# Patient Record
Sex: FEMALE | Race: WHITE | Employment: FULL TIME | URBAN - METROPOLITAN AREA
[De-identification: names, ages, dates, MRNs, and addresses within clinical notes are randomized per-mention and may not be internally consistent; named-entity substitution may affect disease eponyms.]

---

## 2019-06-22 ENCOUNTER — OFFICE VISIT (OUTPATIENT)
Dept: FAMILY MEDICINE CLINIC | Age: 34
End: 2019-06-22
Payer: COMMERCIAL

## 2019-06-22 VITALS
DIASTOLIC BLOOD PRESSURE: 70 MMHG | BODY MASS INDEX: 29.84 KG/M2 | OXYGEN SATURATION: 98 % | SYSTOLIC BLOOD PRESSURE: 122 MMHG | TEMPERATURE: 96.9 F | HEIGHT: 64 IN | HEART RATE: 74 BPM | WEIGHT: 174.8 LBS

## 2019-06-22 DIAGNOSIS — J02.9 ACUTE PHARYNGITIS, UNSPECIFIED ETIOLOGY: Primary | ICD-10-CM

## 2019-06-22 DIAGNOSIS — Z20.828 MONO EXPOSURE: ICD-10-CM

## 2019-06-22 DIAGNOSIS — J02.9 SORE THROAT: ICD-10-CM

## 2019-06-22 LAB
HETEROPHILE ANTIBODIES: NEGATIVE
S PYO AG THROAT QL: NORMAL

## 2019-06-22 PROCEDURE — 86308 HETEROPHILE ANTIBODY SCREEN: CPT | Performed by: NURSE PRACTITIONER

## 2019-06-22 PROCEDURE — 99213 OFFICE O/P EST LOW 20 MIN: CPT | Performed by: NURSE PRACTITIONER

## 2019-06-22 PROCEDURE — 1036F TOBACCO NON-USER: CPT | Performed by: NURSE PRACTITIONER

## 2019-06-22 PROCEDURE — G8427 DOCREV CUR MEDS BY ELIG CLIN: HCPCS | Performed by: NURSE PRACTITIONER

## 2019-06-22 PROCEDURE — 87880 STREP A ASSAY W/OPTIC: CPT | Performed by: NURSE PRACTITIONER

## 2019-06-22 PROCEDURE — G8417 CALC BMI ABV UP PARAM F/U: HCPCS | Performed by: NURSE PRACTITIONER

## 2019-06-22 RX ORDER — SPIRONOLACTONE 50 MG/1
TABLET, FILM COATED ORAL
Refills: 1 | COMMUNITY
Start: 2019-06-13 | End: 2022-07-22

## 2019-06-22 ASSESSMENT — ENCOUNTER SYMPTOMS
SORE THROAT: 1
RHINORRHEA: 1
SHORTNESS OF BREATH: 0
SINUS PAIN: 0
SINUS PRESSURE: 0
COUGH: 1
WHEEZING: 0

## 2019-06-22 ASSESSMENT — PATIENT HEALTH QUESTIONNAIRE - PHQ9: DEPRESSION UNABLE TO ASSESS: PT REFUSES

## 2019-06-22 NOTE — PROGRESS NOTES
Subjective  Azeem Wayne, 30 y.o. female presents today with:  Chief Complaint   Patient presents with    Other     pt. boyfriend has mono tested positive on friday, would like to be tested for it as well. Pharyngitis   This is a new problem. The current episode started in the past 7 days. The problem has been unchanged. Associated symptoms include congestion (has allergies), coughing (mild) and a sore throat (scratchy). Pertinent negatives include no chest pain, chills or fever. 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 Review of Systems   Constitutional: Negative for chills and fever. HENT: Positive for congestion (has allergies), postnasal drip (allergies), rhinorrhea (allergies) and sore throat (scratchy). Negative for ear pain, sinus pressure and sinus pain. Respiratory: Positive for cough (mild). Negative for shortness of breath and wheezing. Cardiovascular: Negative for chest pain. Objective    Vitals:    06/22/19 1139   BP: 122/70   Site: Left Upper Arm   Position: Sitting   Cuff Size: Large Adult   Pulse: 74   Temp: 96.9 °F (36.1 °C)   TempSrc: Tympanic   SpO2: 98%   Weight: 174 lb 12.8 oz (79.3 kg)   Height: 5' 4\" (1.626 m)       Physical Exam   Constitutional: She appears well-developed and well-nourished. No distress. HENT:   Head: Normocephalic and atraumatic. Right Ear: Tympanic membrane is not erythematous and not bulging. No middle ear effusion. Left Ear: Tympanic membrane is not erythematous and not bulging. No middle ear effusion.    Nose: Right sinus exhibits no maxillary sinus tenderness and no frontal sinus tenderness. Left sinus exhibits no maxillary sinus tenderness and no frontal sinus tenderness. Mouth/Throat: No oropharyngeal exudate, posterior oropharyngeal edema or posterior oropharyngeal erythema. Eyes: Conjunctivae are normal. Right eye exhibits no discharge. Left eye exhibits no discharge. Cardiovascular: Normal rate, regular rhythm and normal heart sounds. Pulmonary/Chest: Effort normal and breath sounds normal. No stridor. No respiratory distress. She has no decreased breath sounds. She has no wheezes. She has no rhonchi. Lymphadenopathy:     She has no cervical adenopathy. Skin: Skin is warm and dry. Vitals reviewed. POC Testing Today:   Results for POC orders placed in visit on 06/22/19   POCT Infectious mononucleosis Abs (mono)   Result Value Ref Range    Monospot negative    POCT rapid strep A   Result Value Ref Range    Strep A Ag None Detected None Detected       Assessment & Plan     Mihai Delgado was seen today for other. Diagnoses and all orders for this visit:    Acute pharyngitis, unspecified etiology    Sore throat  -     Natan Barr Virus (Ebv) Antibody Panel I; Future  -     POCT rapid strep A  -     Throat Culture; Future  -     POCT Infectious mononucleosis Abs (mono)    Mono exposure  -     Antan Barr Virus (Ebv) Antibody Panel I; Future  -     POCT Infectious mononucleosis Abs (mono)      Return if symptoms worsen or fail to improve, for follow up with PCP. Educated patient regarding mono precautions, advised to refrain from softball and dance at this time. POC mono today was negative, will obtain antibody panel. Side effects and adverse effects of any medication prescribed today, as well as treatment plan/rationale, follow-up care, and result expectations have been discussed with the patient. Expresses understanding and desires to proceed with treatment plan.       Discussed signs and symptoms which require immediate follow-up in ED/call to 911. Understanding verbalized. I have reviewed and updated the electronic medical record.     Adryan Armendariz, ARTEM - CNP

## 2019-06-22 NOTE — PATIENT INSTRUCTIONS
Patient Education        Sore Throat: Care Instructions  Your Care Instructions    Infection by bacteria or a virus causes most sore throats. Cigarette smoke, dry air, air pollution, allergies, and yelling can also cause a sore throat. Sore throats can be painful and annoying. Fortunately, most sore throats go away on their own. If you have a bacterial infection, your doctor may prescribe antibiotics. Follow-up care is a key part of your treatment and safety. Be sure to make and go to all appointments, and call your doctor if you are having problems. It's also a good idea to know your test results and keep a list of the medicines you take. How can you care for yourself at home? · If your doctor prescribed antibiotics, take them as directed. Do not stop taking them just because you feel better. You need to take the full course of antibiotics. · Gargle with warm salt water once an hour to help reduce swelling and relieve discomfort. Use 1 teaspoon of salt mixed in 1 cup of warm water. · Take an over-the-counter pain medicine, such as acetaminophen (Tylenol), ibuprofen (Advil, Motrin), or naproxen (Aleve). Read and follow all instructions on the label. · Be careful when taking over-the-counter cold or flu medicines and Tylenol at the same time. Many of these medicines have acetaminophen, which is Tylenol. Read the labels to make sure that you are not taking more than the recommended dose. Too much acetaminophen (Tylenol) can be harmful. · Drink plenty of fluids. Fluids may help soothe an irritated throat. Hot fluids, such as tea or soup, may help decrease throat pain. · Use over-the-counter throat lozenges to soothe pain. Regular cough drops or hard candy may also help. These should not be given to young children because of the risk of choking. · Do not smoke or allow others to smoke around you. If you need help quitting, talk to your doctor about stop-smoking programs and medicines.  These can increase your chances of quitting for good. · Use a vaporizer or humidifier to add moisture to your bedroom. Follow the directions for cleaning the machine. When should you call for help? Call your doctor now or seek immediate medical care if:    · You have new or worse trouble swallowing.     · Your sore throat gets much worse on one side.    Watch closely for changes in your health, and be sure to contact your doctor if you do not get better as expected. Where can you learn more? Go to https://zhouwu.SNAPP'. org and sign in to your Lee[employer] account. Enter G503 in the Biomoti box to learn more about \"Sore Throat: Care Instructions. \"     If you do not have an account, please click on the \"Sign Up Now\" link. Current as of: October 21, 2018  Content Version: 12.0  © 1622-1960 Healthwise, Incorporated. Care instructions adapted under license by [facility] ([facility]). If you have questions about a medical condition or this instruction, always ask your healthcare professional. [place] [address] any warranty or liability for your use of this information.

## 2021-10-29 ENCOUNTER — OFFICE VISIT (OUTPATIENT)
Dept: PRIMARY CARE CLINIC | Age: 36
End: 2021-10-29
Payer: COMMERCIAL

## 2021-10-29 VITALS
OXYGEN SATURATION: 99 % | SYSTOLIC BLOOD PRESSURE: 126 MMHG | HEIGHT: 64 IN | WEIGHT: 181 LBS | BODY MASS INDEX: 30.9 KG/M2 | DIASTOLIC BLOOD PRESSURE: 64 MMHG | TEMPERATURE: 98.5 F | HEART RATE: 90 BPM

## 2021-10-29 DIAGNOSIS — Z00.00 PREVENTATIVE HEALTH CARE: Primary | ICD-10-CM

## 2021-10-29 DIAGNOSIS — Z23 NEED FOR INFLUENZA VACCINATION: ICD-10-CM

## 2021-10-29 DIAGNOSIS — R73.9 HYPERGLYCEMIA: ICD-10-CM

## 2021-10-29 DIAGNOSIS — D64.9 ANEMIA, UNSPECIFIED TYPE: ICD-10-CM

## 2021-10-29 DIAGNOSIS — E78.5 HYPERLIPIDEMIA, UNSPECIFIED HYPERLIPIDEMIA TYPE: ICD-10-CM

## 2021-10-29 PROCEDURE — 99395 PREV VISIT EST AGE 18-39: CPT | Performed by: INTERNAL MEDICINE

## 2021-10-29 PROCEDURE — G8482 FLU IMMUNIZE ORDER/ADMIN: HCPCS | Performed by: INTERNAL MEDICINE

## 2021-10-29 PROCEDURE — 90471 IMMUNIZATION ADMIN: CPT | Performed by: INTERNAL MEDICINE

## 2021-10-29 PROCEDURE — 90674 CCIIV4 VAC NO PRSV 0.5 ML IM: CPT | Performed by: INTERNAL MEDICINE

## 2021-10-29 RX ORDER — DESOGESTREL AND ETHINYL ESTRADIOL 0.15-0.03
KIT ORAL
COMMUNITY
Start: 2020-02-04 | End: 2022-07-22

## 2021-10-29 SDOH — ECONOMIC STABILITY: FOOD INSECURITY: WITHIN THE PAST 12 MONTHS, YOU WORRIED THAT YOUR FOOD WOULD RUN OUT BEFORE YOU GOT MONEY TO BUY MORE.: NEVER TRUE

## 2021-10-29 SDOH — ECONOMIC STABILITY: TRANSPORTATION INSECURITY
IN THE PAST 12 MONTHS, HAS THE LACK OF TRANSPORTATION KEPT YOU FROM MEDICAL APPOINTMENTS OR FROM GETTING MEDICATIONS?: NO

## 2021-10-29 SDOH — ECONOMIC STABILITY: TRANSPORTATION INSECURITY
IN THE PAST 12 MONTHS, HAS LACK OF TRANSPORTATION KEPT YOU FROM MEETINGS, WORK, OR FROM GETTING THINGS NEEDED FOR DAILY LIVING?: NO

## 2021-10-29 SDOH — ECONOMIC STABILITY: FOOD INSECURITY: WITHIN THE PAST 12 MONTHS, THE FOOD YOU BOUGHT JUST DIDN'T LAST AND YOU DIDN'T HAVE MONEY TO GET MORE.: NEVER TRUE

## 2021-10-29 ASSESSMENT — ENCOUNTER SYMPTOMS
ABDOMINAL DISTENTION: 0
APNEA: 0
FACIAL SWELLING: 0
PHOTOPHOBIA: 0
BLOOD IN STOOL: 0
CHOKING: 0

## 2021-10-29 ASSESSMENT — PATIENT HEALTH QUESTIONNAIRE - PHQ9
2. FEELING DOWN, DEPRESSED OR HOPELESS: 0
SUM OF ALL RESPONSES TO PHQ QUESTIONS 1-9: 0
1. LITTLE INTEREST OR PLEASURE IN DOING THINGS: 0
SUM OF ALL RESPONSES TO PHQ QUESTIONS 1-9: 0
SUM OF ALL RESPONSES TO PHQ9 QUESTIONS 1 & 2: 0
SUM OF ALL RESPONSES TO PHQ QUESTIONS 1-9: 0

## 2021-10-29 ASSESSMENT — SOCIAL DETERMINANTS OF HEALTH (SDOH): HOW HARD IS IT FOR YOU TO PAY FOR THE VERY BASICS LIKE FOOD, HOUSING, MEDICAL CARE, AND HEATING?: NOT HARD AT ALL

## 2021-10-29 NOTE — PROGRESS NOTES
Lucia Hale 31 y.o. female presents today with   Chief Complaint   Patient presents with   [facility] Doctor    Annual Exam       HPI annual exam  Concern of anemia    Past Medical History:   Diagnosis Date    Acne      Patient Active Problem List    Diagnosis Date Noted    Other acne 07/05/2012     No past surgical history on file. No family history on file. Social History     Socioeconomic History    Marital status:      Spouse name: None    Number of children: None    Years of education: None    Highest education level: None   Occupational History    None   Tobacco Use    Smoking status: Never Smoker    Smokeless tobacco: Never Used   Substance and Sexual Activity    Alcohol use: None    Drug use: None    Sexual activity: None   Other Topics Concern    None   Social History Narrative    None     Social Determinants of Health     Financial Resource Strain: Low Risk     Difficulty of Paying Living Expenses: Not hard at all   Food Insecurity: No Food Insecurity    Worried About Running Out of Food in the Last Year: Never true    Marilee of Food in the Last Year: Never true   Transportation Needs: No Transportation Needs    Lack of Transportation (Medical): No    Lack of Transportation (Non-Medical): No   Physical Activity:     Days of Exercise per Week:     Minutes of Exercise per Session:    Stress:     Feeling of Stress :    Social Connections:     Frequency of Communication with Friends and Family:     Frequency of Social Gatherings with Friends and Family:     Attends Mandaen Services:     Active Member of Clubs or Organizations:     Attends Club or Organization Meetings:     Marital Status:    Intimate Partner Violence:     Fear of Current or Ex-Partner:     Emotionally Abused:     Physically Abused:     Sexually Abused:      No Known Allergies    Review of Systems   Constitutional: Negative for chills and fever.    HENT: Negative for facial swelling and nosebleeds. Eyes: Negative for photophobia and visual disturbance. Respiratory: Negative for apnea and choking. Cardiovascular: Negative for chest pain and palpitations. Gastrointestinal: Negative for abdominal distention and blood in stool. Genitourinary: Negative for enuresis and hematuria. Musculoskeletal: Negative for gait problem and joint swelling. Skin: Negative for rash. Neurological: Negative for syncope and speech difficulty. Hematological: Does not bruise/bleed easily. Psychiatric/Behavioral: Negative for hallucinations and suicidal ideas. Vitals:    10/29/21 0827   BP: 126/64   Site: Left Upper Arm   Cuff Size: Large Adult   Pulse: 90   Temp: 98.5 °F (36.9 °C)   SpO2: 99%   Weight: 181 lb (82.1 kg)   Height: 5' 4\" (1.626 m)       Physical Exam  Constitutional:       Appearance: She is well-developed. HENT:      Head: Normocephalic and atraumatic. Eyes:      Pupils: Pupils are equal, round, and reactive to light. Cardiovascular:      Rate and Rhythm: Normal rate and regular rhythm. Heart sounds: Normal heart sounds. Pulmonary:      Effort: No respiratory distress. Breath sounds: Normal breath sounds. No wheezing. Abdominal:      General: There is no distension. Musculoskeletal:         General: Normal range of motion. Cervical back: Normal range of motion. Neurological:      Mental Status: She is alert and oriented to person, place, and time. Psychiatric:         Mood and Affect: Mood normal.          Assessment/Plan  Aden Frias was seen today for established new doctor and annual exam.    Diagnoses and all orders for this visit:    Preventative health care  -     Lipid Panel; Future  -     Hemoglobin A1C; Future  -     Comprehensive Metabolic Panel; Future  -     CBC With Auto Differential; Future    Hyperlipidemia, unspecified hyperlipidemia type  -     Lipid Panel;  Future    Hyperglycemia  -     Hemoglobin A1C; Future  -     Comprehensive Metabolic Panel; Future    Anemia, unspecified type  -     CBC With Auto Differential; Future    Need for influenza vaccination  -     INFLUENZA, MDCK QUADV, 2 YRS AND OLDER, IM, PF, PREFILL SYR OR SDV, 0.5ML (FLUCELVAX QUADV, PF)        No follow-ups on file.     Taylor Bonilla MD

## 2021-10-30 LAB
ALBUMIN: 4.1 G/DL (ref 3.4–5)
ALP BLD-CCNC: 53 U/L (ref 33–110)
ALT SERPL-CCNC: 17 U/L (ref 7–45)
ANION GAP SERPL CALCULATED.3IONS-SCNC: 13 MMOL/L (ref 10–20)
AST SERPL-CCNC: 19 U/L (ref 9–39)
BASOPHILS # BLD: 0.06 X10E9/L (ref 0–0.1)
BASOPHILS RELATIVE PERCENT: 0.9 % (ref 0–2)
BICARBONATE: 24 MMOL/L (ref 21–32)
BILIRUB SERPL-MCNC: 0.4 MG/DL (ref 0–1.2)
CALCIUM SERPL-MCNC: 9.8 MG/DL (ref 8.6–10.3)
CHLORIDE BLD-SCNC: 104 MMOL/L (ref 98–107)
CHOLESTEROL/HDL RATIO: 2.8
CHOLESTEROL: 188 MG/DL (ref 0–199)
CREAT SERPL-MCNC: 0.86 MG/DL (ref 0.5–1)
EOSINOPHIL # BLD: 0.23 X10E9/L (ref 0–0.7)
EOSINOPHILS RELATIVE PERCENT: 3.4 % (ref 0–6)
ERYTHROCYTE [DISTWIDTH] IN BLOOD BY AUTOMATED COUNT: 12.3 % (ref 11.5–14)
ESTIMATED AVERAGE GLUCOSE: 94 MG/DL
GFR AFRICAN AMERICAN: >60 ML/MIN/1.73M2
GFR NON-AFRICAN AMERICAN: >60 ML/MIN/1.73M2
GLUCOSE: 82 MG/DL (ref 74–99)
HBA1C MFR BLD: 4.9 %
HCT VFR BLD CALC: 39.5 % (ref 36–46)
HDLC SERPL-MCNC: 68 MG/DL
HEMOGLOBIN: 12.9 G/DL (ref 12–16)
IMMATURE GRANULOCYTES %: 0.3 % (ref 0–0.9)
LDL CHOLESTEROL: 98 MG/DL (ref 0–99)
LYMPHOCYTES # BLD: 25.5 % (ref 13–44)
LYMPHOCYTES RELATIVE PERCENT: 1.71 X10E9/L (ref 1.2–4.8)
MCHC RBC AUTO-ENTMCNC: 32.7 G/DL (ref 32–36)
MCV RBC AUTO: 92 FL (ref 80–100)
MONOCYTES # BLD: 0.67 X10E9/L (ref 0.1–1)
MONOCYTES RELATIVE PERCENT: 10 % (ref 2–10)
NEUTROPHILS RELATIVE PERCENT: 59.9 % (ref 40–80)
NEUTROPHILS: 4.01 X10E9/L (ref 1.2–7.7)
PLATELET # BLD: 301 X10E9/L (ref 150–450)
POTASSIUM SERPL-SCNC: 4.1 MMOL/L (ref 3.5–5.3)
RBC # BLD: 4.31 X10E12/L (ref 4–5.2)
SODIUM BLD-SCNC: 137 MMOL/L (ref 136–145)
TOTAL PROTEIN: 7.3 G/DL (ref 6.4–8.2)
TRIGL SERPL-MCNC: 110 MG/DL (ref 0–149)
UREA NITROGEN: 8 MG/DL (ref 6–23)
VLDLC SERPL CALC-MCNC: 22 MG/DL (ref 0–40)
WBC: 6.7 X10E9/L (ref 4.4–11.3)

## 2022-01-07 DIAGNOSIS — U07.1 COVID-19: Primary | ICD-10-CM

## 2022-07-22 ENCOUNTER — OFFICE VISIT (OUTPATIENT)
Dept: PRIMARY CARE CLINIC | Age: 37
End: 2022-07-22
Payer: COMMERCIAL

## 2022-07-22 ENCOUNTER — HOSPITAL ENCOUNTER (OUTPATIENT)
Dept: GENERAL RADIOLOGY | Age: 37
Discharge: HOME | End: 2022-07-24
Payer: COMMERCIAL

## 2022-07-22 VITALS
RESPIRATION RATE: 18 BRPM | OXYGEN SATURATION: 99 % | WEIGHT: 197.8 LBS | TEMPERATURE: 97.9 F | HEIGHT: 64 IN | SYSTOLIC BLOOD PRESSURE: 120 MMHG | DIASTOLIC BLOOD PRESSURE: 78 MMHG | BODY MASS INDEX: 33.77 KG/M2 | HEART RATE: 69 BPM

## 2022-07-22 DIAGNOSIS — S99.912D LEFT ANKLE INJURY, SUBSEQUENT ENCOUNTER: ICD-10-CM

## 2022-07-22 DIAGNOSIS — Z00.00 HEALTH CARE MAINTENANCE: ICD-10-CM

## 2022-07-22 DIAGNOSIS — S99.912D LEFT ANKLE INJURY, SUBSEQUENT ENCOUNTER: Primary | ICD-10-CM

## 2022-07-22 PROCEDURE — 73630 X-RAY EXAM OF FOOT: CPT

## 2022-07-22 PROCEDURE — 73610 X-RAY EXAM OF ANKLE: CPT

## 2022-07-22 PROCEDURE — 99213 OFFICE O/P EST LOW 20 MIN: CPT | Performed by: INTERNAL MEDICINE

## 2022-07-22 RX ORDER — PHENAZOPYRIDINE HYDROCHLORIDE 100 MG/1
TABLET, FILM COATED ORAL
COMMUNITY
End: 2022-07-27

## 2022-07-22 ASSESSMENT — PATIENT HEALTH QUESTIONNAIRE - PHQ9
SUM OF ALL RESPONSES TO PHQ QUESTIONS 1-9: 0
8. MOVING OR SPEAKING SO SLOWLY THAT OTHER PEOPLE COULD HAVE NOTICED. OR THE OPPOSITE, BEING SO FIGETY OR RESTLESS THAT YOU HAVE BEEN MOVING AROUND A LOT MORE THAN USUAL: 0
SUM OF ALL RESPONSES TO PHQ QUESTIONS 1-9: 0
SUM OF ALL RESPONSES TO PHQ9 QUESTIONS 1 & 2: 0
10. IF YOU CHECKED OFF ANY PROBLEMS, HOW DIFFICULT HAVE THESE PROBLEMS MADE IT FOR YOU TO DO YOUR WORK, TAKE CARE OF THINGS AT HOME, OR GET ALONG WITH OTHER PEOPLE: 0
5. POOR APPETITE OR OVEREATING: 0
3. TROUBLE FALLING OR STAYING ASLEEP: 0
2. FEELING DOWN, DEPRESSED OR HOPELESS: 0
SUM OF ALL RESPONSES TO PHQ QUESTIONS 1-9: 0
1. LITTLE INTEREST OR PLEASURE IN DOING THINGS: 0
SUM OF ALL RESPONSES TO PHQ QUESTIONS 1-9: 0
9. THOUGHTS THAT YOU WOULD BE BETTER OFF DEAD, OR OF HURTING YOURSELF: 0
7. TROUBLE CONCENTRATING ON THINGS, SUCH AS READING THE NEWSPAPER OR WATCHING TELEVISION: 0
4. FEELING TIRED OR HAVING LITTLE ENERGY: 0
6. FEELING BAD ABOUT YOURSELF - OR THAT YOU ARE A FAILURE OR HAVE LET YOURSELF OR YOUR FAMILY DOWN: 0

## 2022-07-22 ASSESSMENT — COLUMBIA-SUICIDE SEVERITY RATING SCALE - C-SSRS
6. HAVE YOU EVER DONE ANYTHING, STARTED TO DO ANYTHING, OR PREPARED TO DO ANYTHING TO END YOUR LIFE?: NO
1. WITHIN THE PAST MONTH, HAVE YOU WISHED YOU WERE DEAD OR WISHED YOU COULD GO TO SLEEP AND NOT WAKE UP?: NO
2. HAVE YOU ACTUALLY HAD ANY THOUGHTS OF KILLING YOURSELF?: NO

## 2022-07-22 NOTE — PROGRESS NOTES
Subjective:      Patient ID: Bettye Mcgrath is a 30 y.o. female    Left ankle pain x 1 week   HPI  Pt presents with sudden onset of dull left ankle pain  after twisting the foot when getting off her bed. Pain is rated 5/10, nonradiating. Assoc foot swelling, no tingling or numbness, no weakness. Claims XR at an urgent care showed possible bone fragment. Past Medical History:   Diagnosis Date    Acne      History reviewed. No pertinent surgical history. Social History     Socioeconomic History    Marital status:      Spouse name: Not on file    Number of children: Not on file    Years of education: Not on file    Highest education level: Not on file   Occupational History    Not on file   Tobacco Use    Smoking status: Never    Smokeless tobacco: Never   Substance and Sexual Activity    Alcohol use: Not on file    Drug use: Not on file    Sexual activity: Not on file   Other Topics Concern    Not on file   Social History Narrative    Not on file     Social Determinants of Health     Financial Resource Strain: Low Risk     Difficulty of Paying Living Expenses: Not hard at all   Food Insecurity: No Food Insecurity    Worried About Running Out of Food in the Last Year: Never true    [address] in the Last Year: Never true   Transportation Needs: No Transportation Needs    Lack of Transportation (Medical): No    Lack of Transportation (Non-Medical): No   Physical Activity: Not on file   Stress: Not on file   Social Connections: Not on file   Intimate Partner Violence: Not on file   Housing Stability: Not on file     History reviewed. No pertinent family history. Allergies:  Patient has no known allergies. Patient Active Problem List   Diagnosis    Other acne     Current Outpatient Medications on File Prior to Visit   Medication Sig Dispense Refill    phenazopyridine (PYRIDIUM) 100 MG tablet Pyridium 100 mg tablet   Take 1 tablet 3 times a day by oral route.        No current facility-administered medications on file prior to visit. Review of Systems   HENT:  Negative for rhinorrhea, sinus pressure, sinus pain, sneezing and sore throat. Respiratory:  Negative for cough, shortness of breath and wheezing. Cardiovascular:  Negative for chest pain. Gastrointestinal:  Negative for abdominal pain, diarrhea, nausea and vomiting. Endocrine: Negative for cold intolerance and heat intolerance. Genitourinary:  Negative for dysuria and frequency. Musculoskeletal:  Positive for arthralgias and joint swelling. Neurological:  Negative for dizziness and light-headedness. Psychiatric/Behavioral:  Negative for dysphoric mood. The patient is not nervous/anxious. Objective:   /78   Pulse 69   Temp 97.9 °F (36.6 °C)   Resp 18   Ht 5' 4\" (1.626 m)   Wt 197 lb 12.8 oz (89.7 kg)   SpO2 99%   BMI 33.95 kg/m²     Physical Exam  Constitutional:       General: She is not in acute distress. Appearance: She is not diaphoretic. Cardiovascular:      Rate and Rhythm: Normal rate and regular rhythm. Heart sounds: Normal heart sounds, S1 normal and S2 normal.   Pulmonary:      Effort: Pulmonary effort is normal. No respiratory distress. Breath sounds: Normal breath sounds. No wheezing or rales. Chest:      Chest wall: No tenderness. Abdominal:      General: Bowel sounds are normal.      Tenderness: There is no abdominal tenderness. Musculoskeletal:      Comments: Left foot and ankle swelling and ecchymosis with marked lateral malleolar and lateral metatarsal TTP   Left ankle ROM limited in flexion, extension, inversion and eversion   Gait is antalgic     Assessment:       Diagnosis Orders   1.  Left ankle injury, subsequent encounter  XR ANKLE LEFT (MIN 3 VIEWS)    XR FOOT LEFT (MIN 3 VIEWS)      2. Health care maintenance  HIV Screen    Hepatitis C Antibody        Plan:      Orders Placed This Encounter   Procedures    XR ANKLE LEFT (MIN 3 VIEWS)     Standing Status:   Future Number of Occurrences:   1     Standing Expiration Date:   7/22/2023     Order Specific Question:   Reason for exam:     Answer:   ?? fracture    XR FOOT LEFT (MIN 3 VIEWS)     Standing Status:   Future     Number of Occurrences:   1     Standing Expiration Date:   7/22/2023     Order Specific Question:   Reason for exam:     Answer:   ?? fracture    HIV Screen     Standing Status:   Future     Standing Expiration Date:   7/22/2023    Hepatitis C Antibody     Standing Status:   Future     Standing Expiration Date:   7/22/2023     No orders of the defined types were placed in this encounter. OTC ibuprofen as needed. Return if symptoms worsen or fail to improve.

## 2022-07-23 ASSESSMENT — ENCOUNTER SYMPTOMS
SORE THROAT: 0
VOMITING: 0
SINUS PAIN: 0
DIARRHEA: 0
SHORTNESS OF BREATH: 0
COUGH: 0
NAUSEA: 0
WHEEZING: 0
SINUS PRESSURE: 0
RHINORRHEA: 0
ABDOMINAL PAIN: 0

## 2022-07-24 DIAGNOSIS — S92.023P: Primary | ICD-10-CM

## 2022-07-25 ENCOUNTER — TELEPHONE (OUTPATIENT)
Dept: PRIMARY CARE CLINIC | Age: 37
End: 2022-07-25

## 2022-07-25 DIAGNOSIS — S92.023P: ICD-10-CM

## 2022-07-25 DIAGNOSIS — S99.912D LEFT ANKLE INJURY, SUBSEQUENT ENCOUNTER: Primary | ICD-10-CM

## 2022-07-27 ENCOUNTER — INITIAL CONSULT (OUTPATIENT)
Dept: PODIATRY | Age: 37
End: 2022-07-27
Payer: COMMERCIAL

## 2022-07-27 VITALS — BODY MASS INDEX: 32.44 KG/M2 | HEIGHT: 64 IN | WEIGHT: 190 LBS | TEMPERATURE: 96.9 F

## 2022-07-27 DIAGNOSIS — S93.492A SPRAIN OF ANTERIOR TALOFIBULAR LIGAMENT OF LEFT ANKLE, INITIAL ENCOUNTER: ICD-10-CM

## 2022-07-27 DIAGNOSIS — M25.572 ACUTE LEFT ANKLE PAIN: Primary | ICD-10-CM

## 2022-07-27 PROCEDURE — 99203 OFFICE O/P NEW LOW 30 MIN: CPT | Performed by: PODIATRIST

## 2022-07-27 ASSESSMENT — ENCOUNTER SYMPTOMS
BACK PAIN: 0
SHORTNESS OF BREATH: 0
NAUSEA: 0
VOMITING: 0

## 2022-07-27 NOTE — PROGRESS NOTES
[address] [facility]  [address] [place] [address]  Dept: [phone]  Loc: [phone]       Buddy Dose  (1985)    7/27/22    Subjective     Buddy Dose is 30 y.o. female who complains today of:    Chief Complaint   Patient presents with    Foot Pain     Left     Ankle Pain     Left       Buddy Dose is seen in consultation at the request of Yara Willson MD for evaluation of left foot and ankle pain. HPI: Patient presents with a complaint of left foot and ankle pain. Patient states that she turned her ankle when getting out of bed about 2 weeks ago. Patient describes having an injury consistent with an ankle inversion. Patient describes having achy and tingling sensations to the left foot and ankle. Patient rates her pain level at 4/10. Patient states that her pain level has improved since the initial injury. Radiographs were performed at [facility] primary care which were positive for a distant avulsion fracture to the anterior process of the calcaneus, no tib-fib or talar fracture noted. Patient was dispensed a walking boot. Patient states that she has been taking over-the-counter ibuprofen on a sporadic basis. Patient states that his decrease in her pain when wearing the boot. Patient states that walking or standing without the boot in place increases her pain, rest decreases her pain. Patient does report a history of bilateral ankle sprains in the past.    Review of Systems   Constitutional:  Negative for chills and fever. HENT:  Negative for hearing loss. Respiratory:  Negative for shortness of breath. Cardiovascular:  Negative for chest pain. Gastrointestinal:  Negative for nausea and vomiting. Genitourinary:  Negative for difficulty urinating. Musculoskeletal:  Positive for gait problem. Negative for back pain. Skin:  Negative for wound.    Neurological: Negative for numbness. Hematological:  Bruises/bleeds easily. Psychiatric/Behavioral:  Negative for sleep disturbance. Allergies:  Patient has no known allergies. No current outpatient medications on file prior to visit. No current facility-administered medications on file prior to visit. Past Medical History:   Diagnosis Date    Acne      No past surgical history on file. Social History     Socioeconomic History    Marital status:      Spouse name: Not on file    Number of children: Not on file    Years of education: Not on file    Highest education level: Not on file   Occupational History    Not on file   Tobacco Use    Smoking status: Never    Smokeless tobacco: Never   Substance and Sexual Activity    Alcohol use: Not on file    Drug use: Not on file    Sexual activity: Not on file   Other Topics Concern    Not on file   Social History Narrative    Not on file     Social Determinants of Health     Financial Resource Strain: Low Risk     Difficulty of Paying Living Expenses: Not hard at all   Food Insecurity: No Food Insecurity    Worried About Running Out of Food in the Last Year: Never true    [address] in the Last Year: Never true   Transportation Needs: No Transportation Needs    Lack of Transportation (Medical): No    Lack of Transportation (Non-Medical): No   Physical Activity: Not on file   Stress: Not on file   Social Connections: Not on file   Intimate Partner Violence: Not on file   Housing Stability: Not on file     No family history on file. Objective:   Vitals:  Temp 96.9 °F (36.1 °C)   Ht 5' 4\" (1.626 m)   Wt 190 lb (86.2 kg)   BMI 32.61 kg/m² Pain Score:   3      Physical Exam  Constitutional:       Appearance: She is obese. HENT:      Head: Normocephalic and atraumatic. Cardiovascular:      Pulses:           Dorsalis pedis pulses are 2+ on the right side and 2+ on the left side.         Posterior tibial pulses are 2+ on the right side and 2+ on the left side. Comments: Focal swelling noted to the lateral left ankle and foot. Pulmonary:      Effort: Pulmonary effort is normal.   Musculoskeletal:         General: Tenderness and signs of injury present. Right lower leg: No edema. Left lower leg: Edema present. Right foot: Bunion present. Left foot: Decreased range of motion. Bunion present. Feet:      Right foot:      Skin integrity: Skin integrity normal.      Toenail Condition: Right toenails are normal.      Left foot:      Skin integrity: Erythema present. Toenail Condition: Left toenails are normal.      Comments: Planus foot type noted bilaterally. MMT graded at 5/5 for all muscle groups bilaterally, mild guarding noted with resisted dorsiflexion and eversion of the left foot. There is tenderness to palpation of the anterior talofibular ligament lateral left ankle. Anterior drawer, talar tilt, external rotation, and distal squeeze exams are negative to the left ankle. There is no tenderness palpation of the base the fifth metatarsal left foot, no tenderness to palpation of the Achilles tendon or its insertion. There is no tenderness palpation of the other lateral collateral ligaments or the deltoid ligaments of the left ankle. Hallux abductovalgus deformity noted bilaterally, limited dorsiflexion noted to the right first MPJ. There is no tenderness to palpation of the left calcaneus anterior process, there is no pain elicited with ROM of the calcaneocuboid joint. Lymphadenopathy:      Comments: The popliteal lymph nodes are soft and nontender. Skin:     General: Skin is warm and dry. Capillary Refill: Capillary refill takes less than 2 seconds. Findings: Bruising and erythema present. Comments: No open lesions noted. Erythema and ecchymosis noted to the lateral left foot and ankle. Normal skin turgor noted bilaterally. Normal skin texture noted bilaterally.    Neurological:      General: No focal deficit present. Mental Status: She is alert and oriented to person, place, and time. Deep Tendon Reflexes: Babinski sign absent on the right side. Babinski sign absent on the left side. Reflex Scores:       Patellar reflexes are 2+ on the right side and 2+ on the left side. Achilles reflexes are 2+ on the right side. Comments: No apparent alteration of light touch sensation is noted to the left foot. Psychiatric:         Mood and Affect: Mood normal.         Behavior: Behavior normal.       Assessment:      Diagnosis Orders   1. Acute left ankle pain  [facility]      2. Sprain of anterior talofibular ligament of left ankle, initial encounter  [facility]          Plan:     Left ankle anterior talofibular ligament sprain:  I have had a lengthy discussion with the patient today and discussed the various treatment options available. I specifically discussed a conservative treatment protocol for the immediate inflammatory nature of the injury. I have recommended that the patient continue use of the fracture boot until she is able to tolerate transitioning to an ASO brace. The patient has been advised to wear the ASO at all times when standing or walking with a supportive shoe. Patient encouraged to continue elevating and icing. I have submitted an order for formal physical therapy. Patient instructed to take 600 mg ibuprofen 3 times a day for 3 to 4 days in a row, she is to discontinue if she develops any side effects or complications. Orders Placed This Encounter   Procedures    [facility]     Referral Priority:   Routine     Referral Type:   Eval and Treat     Referral Reason:   Specialty Services Required     Requested Specialty:   Physical Therapist     Number of Visits Requested:   1           Follow up:  Return in about 4 weeks (around 8/24/2022).     Terrence Sotomayor DPM  Level of medical decision making: low. Please note that this report has been partially produced using speech recognition software which may cause errors including grammar, punctuation, and spelling or words and phrases that may seem inappropriate. If there are questions or concerns please feel free to contact me for clarification.

## 2022-08-11 ENCOUNTER — HOSPITAL ENCOUNTER (OUTPATIENT)
Dept: PHYSICAL THERAPY | Age: 37
Setting detail: THERAPIES SERIES
Discharge: HOME | End: 2022-08-11
Payer: COMMERCIAL

## 2022-08-11 PROCEDURE — 97162 PT EVAL MOD COMPLEX 30 MIN: CPT

## 2022-08-11 PROCEDURE — 97110 THERAPEUTIC EXERCISES: CPT

## 2022-08-11 NOTE — PLAN OF CARE
[address]  [address]      SBYQY:[phone]    [] Certification  [] Recertification [x]  Plan of Care  [] Progress Note [] Discharge      Referring Provider: Kaya Jauregui DPM      From:  Katie Jesus PT   Patient: Justus Dumont (89 y.o. female) : 1985 Date: 2022   Medical Diagnosis: Pain in left ankle and joints of left foot [M25.572]  Sprain of other ligament of left ankle, initial encounter [S93.492A] Sprain of anterior talofiburlar ligament of left ankle; Acute left ankle pain  Treatment Diagnosis: decreased left ankle ROM, decreased left ankle strength, left ankle pain      Progress Report Period from:  2022  to 2022    Visits to Date: 1 No Show: 0 Cancelled Appts: 0    OBJECTIVE:   Short Term Goals =Long term goals      Long Term Goals - Time Frame for Long term goals : 4 weeks  Goals Current/ Discharge status Status   Long term goal 1: Patient will ambulate without brace with </= 1/10 pain level. Patient reports pain with WB on left ankle. New   Long term goal 2: Patient will increase left ankle ROM to [facility] for improved functional tolerance. AROM LLE (degrees)  L Ankle Dorsiflexion 0-20: 12 deg  L Ankle Plantar Flexion 0-45: 45deg with pain  L Ankle Forefoot Inversion 0-40: 18 deg  L Ankle Forefoot Eversion 0-20: 10 deg          New   Long term goal 3: Patient will increase strength in left ankle to 5/5 for improved ambulation tolerance. Strength RLE  R Ankle Dorsiflexion: 5/5  R Ankle Plantar flexion: 5/5  R Ankle Inversion: 5/5  R Ankle Eversion: 5/5  Strength LLE  L Ankle Dorsiflexion: 4+/5  L Ankle Plantar Flexion: 3+/5  L Ankle Inversion: 4-/5  L Ankle Eversion: 4/5  L Great Toe Extension: 5/5       New   Long term goal 4: Patient will be independent with HEP. Patient issued HEP.  New       Body Structures, Functions, Activity Limitations Requiring Skilled Therapeutic Intervention: Decreased ROM, Decreased functional mobility , Decreased strength, Decreased balance, Increased pain  Assessment: Patient reports left ankle sprain in July. Upon PT evaluation, patient demonstrates decreased left ankle ROM with impaired ankle strength. Further PT recommended to improve ROM, strength, and balance for overall quality of life. Therapy Prognosis: Good      PT Education: Goals;PT Role;Plan of Care;Home Exercise Program    PLAN: [x] Evaluate and Treat  Frequency/Duration:  Plan Frequency: 1-2  Plan weeks: 4  Current Treatment Recommendations: Strengthening, ROM, Balance training, Manual Therapy - Soft Tissue Mobilization, Manual Therapy - Joint Manipulation, Endurance training, Home exercise program, Patient/Caregiver education & training, Modalities     Precautions:                            Patient Status:[x] Continue/ Initiate plan of Care     [] Discharge PT. Recommend pt continue with HEP. [] Additional visits requested, Please re-certify for additional visits:     [] Hold     Objective information provided by: Electronically signed by Deb Rios PT on 8/11/22 at 9:57 AM EDT        Signature: If you have any questions or concerns, please don't hesitate to call. Thank you for your referral.    I have reviewed this plan of care and certify a need for medically necessary rehabilitation services.     Physician Signature:__________________________________________________________  Date:  Please sign and return

## 2022-08-11 NOTE — PROGRESS NOTES
[facility]   EVALUATION            Physical Therapy: Initial Evaluation    Patient: Jg Monteiro (63 y.o.     female)   Examination Date: 2022   :  1985 ;    Confirmed: Yes MRN: [id number]  CSN: [id number]   Insurance: Payor: [facility] / Plan: Avelina Welch  / Product Type: *No Product type* /   Insurance ID: [id number] - (Commercial) Secondary Insurance (if applicable):    Referring Physician: Anthony Aparicio DPM      PCP: Pili Dickens MD Visits to Date/Visits Approved:     No Show/Cancelled Appts: 0 / 0     Medical Diagnosis: Pain in left ankle and joints of left foot [M25.572]  Sprain of other ligament of left ankle, initial encounter [facility] Sprain of anterior talofiburlar ligament of left ankle; Acute left ankle pain  Treatment Diagnosis: decreased left ankle ROM, decreased left ankle strength, left ankle pain     PERTINENT MEDICAL HISTORY   Patient Assessed for Rehabilitation Services: Yes       Medical History: Chart Reviewed: Yes   Past Medical History:   Diagnosis Date    Acne      Surgical History: No past surgical history on file. Medications: No current outpatient medications on file. Allergies: Patient has no known allergies. SUBJECTIVE EXAMINATION      ,           Subjective History:    Subjective: Patient reports she has a high bed and jump off and rolled her left ankle on 7/15/22. Reports she had edema and pain making it difficult to walk. 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 SOFT TISSUE SWELLING, LEFT ANKLE. Patient wore an walking boot for one week and now has ASO ankle brace when she is up. Increased pain with walking for increased distances or going down stairs. Reports tingling in toes has gone away.   Additional Pertinent Hx (if applicable): hx of right ankle sprain, asthma, migraines   Prior diagnostic testing[de-identified] X-ray  Comment: RTD 22      Learning/Language: Learning  Does the patient/guardian have any barriers to learning?: No barriers  What is the preferred language of the patient/guardian?: English  How does the patient/guardian prefer to learn new concepts?: Listening, Reading, Demonstration, Pictures/Videos     Pain Screening    Pain Screening  Patient Currently in Pain: No    Functional Status    Social History:    Social History  Lives With: Spouse  Type of Home: House  Home Layout: Two level  Home Access: Stairs to enter with rails  Entrance Stairs - Number of Steps: 3    Occupation/Interests:   Leisure & Hobbies: softball    Prior Level of Function:     Independent        Current Level of Function:          ADL Assistance: Independent  Ambulation Assistance: Independent  Transfer Assistance: Independent  Additional Comments: denies any falls         OBJECTIVE EXAMINATION     Restrictions:          Wear ASO ankle brace as needed    Review of Systems:  Vision: Within Functional Limits (wears glasses)  Hearing: Within functional limits  Follows Commands: Within Functional Limits    Observations:   General Observations  General Observations: Yes  Foot/Ankle: L Pes Planus, R Pes Planus    Palpation:   Left Ankle Palpation: tenderness on lateral side of ankle    Mobility:            Ambulation  Surface: carpet  Device: No Device  Other Apparatus:  (left ankle brace)  Assistance: Independent  Gait Deviations: None  Distance: clinical distance in department      Balance Screen:   Balance  Single Stance R Leg: WFLs  Single Stance L Leg: increased difficulty    Neuro Screen: Sensation  Overall Sensation Status: WFL    Left AROM  Right AROM         AROM LLE (degrees)  L Ankle Dorsiflexion 0-20: 12 deg  L Ankle Plantar Flexion 0-45: 45deg with pain  L Ankle Forefoot Inversion 0-40: 18 deg  L Ankle Forefoot Eversion 0-20: 10 deg             Left Strength  Right Strength         Strength LLE  L Ankle Dorsiflexion: 4+/5  L Ankle Plantar Flexion: 3+/5  L Ankle Inversion: 4-/5  L Ankle Eversion: 4/5  L Great Toe Extension: 5/5    Strength RLE  R Ankle Dorsiflexion: 5/5  R Ankle Plantar flexion: 5/5  R Ankle Inversion: 5/5  R Ankle Eversion: 5/5         Outcomes Score:  Exam: LEFS: 57/80         Treatment:    Exercises:   Exercises  Exercise 1: seated gastroc stretch with strap 20s x 3  Exercise 2: towel scrunches x 1 minute  Exercise 3: ankle AROM (ankle pumps seated x 10), inversion/eversion x 10, ankle circles x 10  Exercise 4: ankle Tband NV*  Exercise 5: BAPs*  Exercise 6: SLS*  Exercise 7: bike*  Exercise 20: HEP: gastroc stretch, ankle AROM, towel scrunches     Modalities:Modalities: Yes  Cryotherapy (CPT 29686)  Number Minutes Cryotherapy: *     Manual:  Manual Therapy  Soft Tissue Mobilizaton: STM ankle*     *Indicates exercise,modality, or manual techniques to be initiated when appropriate       ASSESSMENT     Impression: Assessment: Patient reports left ankle sprain in July. Upon PT evaluation, patient demonstrates decreased left ankle ROM with impaired ankle strength. Further PT recommended to improve ROM, strength, and balance for overall quality of life. Body Structures, Functions, Activity Limitations Requiring Skilled Therapeutic Intervention: Decreased ROM, Decreased functional mobility , Decreased strength, Decreased balance, Increased pain    Statement of Medical Necessity: Physical Therapy is both indicated and medically necessary as outlined in the POC to increase the likelihood of meeting the functionally related goals stated below.      Patient's Activity Tolerance: Patient tolerated evaluation without incident      Patient's rehabilitation potential/prognosis is considered to be: Good    Factors which may impact rehabilitation potential include: None     Patient Education: Goals, PT Role, Plan of Care, Home Exercise Program      GOALS   Patient Goal(s): Patient goals : \"strengthen ankle so I can walk without a brace\"        Long Term Goals Completed by 4 weeks Goal Status Patient will ambulate without brace with </= 1/10 pain level. New   Patient will increase left ankle ROM to [facility] for improved functional tolerance. New   Patient will increase strength in left ankle to 5/5 for improved ambulation tolerance. New   Patient will be independent with HEP. New          TREATMENT PLAN       Requires PT Follow-Up: Yes    Treatment may include any combination of the following: Strengthening, ROM, Balance training, Manual Therapy - Soft Tissue Mobilization, Manual Therapy - Joint Manipulation, Endurance training, Home exercise program, Patient/Caregiver education & training, Modalities     Frequency / Duration:  Patient to be seen 1-2 times per week for 4 weeks  Plan Comment:               Eval Complexity:   Decision Making: Medium Complexity  History: Personal Factors and/or Comorbidities Impacting POC: Medium  History: asthma, hx of right ankle surgery  Examination of body system(s) including body structures and functions, activity limitations, and/or participation restrictions: Medium  Exam: LEFS: 57/80  Clinical Presentation: Medium  Clinical Presentation: evolving  Clinical Decision Making : Medium Complexity    POST-PAIN     Pain Rating (0-10 pain scale):   0/10  Location and pain description same as pre-treatment unless indicated. Action: [] NA  [] Call Physician  [] Perform HEP  [] Meds as prescribed    Evaluation and patient rights have been reviewed and patient agrees with plan of care.   Yes  []  No  []   Explain:     Jonah Fall Risk Assessment  Risk Factor Scale  Score   History of Falls [] Yes  [x] No 25  0 0   Secondary Diagnosis [] Yes  [x] No 15  0 0   Ambulatory Aid [] Furniture  [] Crutches/cane/walker  [x] None/bedrest/wheelchair/nurse 30  15  0 0   IV/Heparin Lock [] Yes  [x] No 20  0 0   Gait/Transferring [] Impaired  [] Weak  [x] Normal/bedrest/immobile 20  10  0 0   Mental Status [] Forgets limitations  [x] Oriented to own ability 15  0 0      Total:0     Based on the Assessment score: check the appropriate box.   [x]  No intervention needed   Low =   Score of 0-24  []  Use standard prevention interventions Moderate =  Score of 24-44   [] Discuss fall prevention strategies   [] Indicate moderate falls risk on eval  []  Use high risk prevention interventions High = Score of 45 and higher   [] Discuss fall prevention strategies   [] Provide supervision during treatment time      Minutes:  PT Individual Minutes  Time In: 0800  Time Out: 0840  Minutes: 40  Timed Code Treatment Minutes: 10 Minutes  Procedure Minutes:30 PT evaluation minutes     Timed Activity Minutes Units   Ther Ex 10 1   Manual          Electronically signed by Chuy Pimentel PT on 8/11/22 at 9:55 AM EDT

## 2022-08-16 ENCOUNTER — HOSPITAL ENCOUNTER (OUTPATIENT)
Dept: PHYSICAL THERAPY | Age: 37
Setting detail: THERAPIES SERIES
Discharge: HOME | End: 2022-08-16
Payer: COMMERCIAL

## 2022-08-16 PROCEDURE — 97110 THERAPEUTIC EXERCISES: CPT

## 2022-08-16 NOTE — PROGRESS NOTES
ankle ROM and stab therex this date. Reviewed previously issued HEP. Incorporated ankle tband and BAPs board ex's with good tolerance. Concluded on RB. Will update HEP next visit. Treatment Diagnosis: decreased left ankle ROM, decreased left ankle strength, left ankle pain  Therapy Prognosis: Good      Post-Pain Assessment:       Pain Rating (0-10 pain scale):   0/10   Location and pain description same as pre-treatment unless indicated. Action: [x] NA   [] Perform HEP  [] Meds as prescribed  [] Modalities as prescribed   [] Call Physician     GOALS   Patient Goal(s): Patient goals : \"strengthen ankle so I can walk without a brace\"      Long Term Goals Completed by 4 weeks Goal Status   LTG 1 Patient will ambulate without brace with </= 1/10 pain level. In progress   LTG 2 Patient will increase left ankle ROM to [facility] for improved functional tolerance. In progress   LTG 3 Patient will increase strength in left ankle to 5/5 for improved ambulation tolerance. In progress   LTG 4 Patient will be independent with HEP. In progress     Plan:  Frequency/Duration:  Plan  Plan Frequency: 1-2  Plan weeks: 4  Current Treatment Recommendations: Strengthening, ROM, Balance training, Manual Therapy - Soft Tissue Mobilization, Manual Therapy - Joint Manipulation, Endurance training, Home exercise program, Patient/Caregiver education & training, Modalities  Pt to continue current HEP. See objective section for any therapeutic exercise changes, additions or modifications this date.     Therapy Time:  PT Individual Minutes  Time In: 7851  Time Out: 5159  Minutes: 40  Timed Code Treatment Minutes: 40 Minutes  Timed Activity Minutes Units   Ther Ex 40 3     Electronically signed by Ann Barba PTA on 8/16/22 at 5:38 PM EDT

## 2022-08-18 ENCOUNTER — HOSPITAL ENCOUNTER (OUTPATIENT)
Dept: PHYSICAL THERAPY | Age: 37
Setting detail: THERAPIES SERIES
Discharge: HOME | End: 2022-08-18
Payer: COMMERCIAL

## 2022-08-18 PROCEDURE — 97110 THERAPEUTIC EXERCISES: CPT

## 2022-08-18 ASSESSMENT — PAIN SCALES - GENERAL: PAINLEVEL_OUTOF10: 3

## 2022-08-18 ASSESSMENT — PAIN DESCRIPTION - DESCRIPTORS: DESCRIPTORS: SORE

## 2022-08-18 ASSESSMENT — PAIN DESCRIPTION - LOCATION: LOCATION: ANKLE

## 2022-08-18 NOTE — PROGRESS NOTES
[address] [place] [address]  [address]  MGIGL:[phone]  Treatment Note        Date: 2022  Patient: Jhoana Price  : 1985   Confirmed: Yes  MRN: [id number]  Referring Provider: Rafaela Perales DPM  Medical Diagnosis: Pain in left ankle and joints of left foot [M25.572]  Sprain of other ligament of left ankle, initial encounter [S93.614A]    Treatment Diagnosis: decreased left ankle ROM, decreased left ankle strength, left ankle pain    Visit Information:  Insurance: Payor: [employer] / Plan: [employer]  / Product Type: *No Product type* /   PT Visit Information  Total # of Visits Approved: 20  Total # of Visits to Date: 3  No Show: 0  Canceled Appointment: 0  Progress Note Counter: 3/4-    Subjective Information:  Subjective: Pt's left ankle has been sore the past couple of days. HEP Compliance:  [x] Good [] Fair [] Poor [] Reports not doing due to:    Pain Screening  Patient Currently in Pain: Yes  Pain Assessment: 0-10  Pain Level: 3  Pain Location: Ankle  Pain Descriptors: Sore    Treatment:  Exercises:  Exercises  Exercise 1: seated gastroc stretch with strap 20s x 3  Exercise 2: towel scrunches x 1 minute  Exercise 3: ankle AROM (ankle pumps seated x 15), inversion/eversion x 15, ankle circles cw/ccw x 15  Exercise 4: 4 way ankle with RTB x 15 ea  Exercise 5: BAPs (fwd/bwd, side/side, circles cw/ccw) x 15 ea  Exercise 6: SLS*  Exercise 7: bike (seat 4 in back) x 5 min  Exercise 20: HEP: gastroc stretch, ankle AROM, towel scrunches. Updated HEP on 22: 4 way ankle tband ex's      *Indicates exercise, modality, or manual techniques to be initiated when appropriate      Strength: [x] NT  [] MMT completed:      ROM: [x] NT  [] ROM measurements:      Assessment:    Body Structures, Functions, Activity Limitations Requiring Skilled Therapeutic Intervention: Decreased ROM, Decreased functional mobility , Decreased strength, Decreased balance, Increased pain  Assessment: Reviewed L ankle ROM and stab therex from initial visit. No additional ex's today d/t soreness reported. Issued 4 way ankle tband ex's for HEP. Instructed pt to ice ankle as needed. Treatment Diagnosis: decreased left ankle ROM, decreased left ankle strength, left ankle pain  Therapy Prognosis: Good      Post-Pain Assessment:       Pain Rating (0-10 pain scale):   \"sore\" /10   Location and pain description same as pre-treatment unless indicated. Action: [x] NA   [] Perform HEP  [] Meds as prescribed  [] Modalities as prescribed   [] Call Physician     GOALS   Patient Goal(s): Patient goals : \"strengthen ankle so I can walk without a brace\"      Long Term Goals Completed by 4 weeks Goal Status   LTG 1 Patient will ambulate without brace with </= 1/10 pain level. In progress   LTG 2 Patient will increase left ankle ROM to [facility] for improved functional tolerance. In progress   LTG 3 Patient will increase strength in left ankle to 5/5 for improved ambulation tolerance. In progress   LTG 4 Patient will be independent with HEP. In progress       Plan:  Frequency/Duration:  Plan  Plan Frequency: 1-2  Plan weeks: 4  Current Treatment Recommendations: Strengthening, ROM, Balance training, Manual Therapy - Soft Tissue Mobilization, Manual Therapy - Joint Manipulation, Endurance training, Home exercise program, Patient/Caregiver education & training, Modalities  Pt to continue current HEP. See objective section for any therapeutic exercise changes, additions or modifications this date.     Therapy Time:  PT Individual Minutes  Time In: 0800  Time Out: 0895  Minutes: 42  Timed Code Treatment Minutes: 42 Minutes  Timed Activity Minutes Units   Ther Ex 42 3     Electronically signed by Austin Woods PTA on 8/18/22 at 8:06 AM EDT

## 2022-08-23 ENCOUNTER — HOSPITAL ENCOUNTER (OUTPATIENT)
Dept: PHYSICAL THERAPY | Age: 37
Setting detail: THERAPIES SERIES
Discharge: HOME | End: 2022-08-23
Payer: COMMERCIAL

## 2022-08-23 PROCEDURE — 97110 THERAPEUTIC EXERCISES: CPT

## 2022-08-23 ASSESSMENT — PAIN SCALES - GENERAL: PAINLEVEL_OUTOF10: 1

## 2022-08-23 ASSESSMENT — PAIN DESCRIPTION - LOCATION: LOCATION: ANKLE

## 2022-08-23 ASSESSMENT — PAIN DESCRIPTION - ORIENTATION: ORIENTATION: LEFT

## 2022-08-23 ASSESSMENT — PAIN DESCRIPTION - DESCRIPTORS: DESCRIPTORS: SORE

## 2022-08-23 NOTE — PROGRESS NOTES
[address] [place] [address]  [address]  TNTRX:[phone]  Treatment Note        Date: 2022  Patient: Yevette Kayser  : 1985   Confirmed: Yes  MRN: [id number]  Referring Provider: Dudley Floyd DPM  Medical Diagnosis: Pain in left ankle and joints of left foot [M25.572]  Sprain of other ligament of left ankle, initial encounter [S93.910A]    Treatment Diagnosis: decreased left ankle ROM, decreased left ankle strength, left ankle pain    Visit Information:  Insurance: Payor: R / Plan: Jade Matt  / Product Type: *No Product type* /   PT Visit Information  Total # of Visits Approved: 20  Total # of Visits to Date: 4  No Show: 0  Canceled Appointment: 0  Progress Note Counter: -    Subjective Information:  Subjective: Pt reports L ankle is doing good and can tell it's getting stronger. She forgot to put her brace on today and is just a little sore. Her  helps with her tband exercises at home. HEP Compliance:  [x] Good [] Fair [] Poor [] Reports not doing due to:    Pain Screening  Patient Currently in Pain: Yes  Pain Assessment: 0-10  Pain Level: 1  Pain Location: Ankle  Pain Orientation: Left  Pain Descriptors: Sore    Treatment:  Exercises:  Exercises  Exercise 1: seated gastroc stretch with strap 20s x 3  Exercise 2: ankle AROM (ankle pumps seated x 15), inversion/eversion x 15, ankle circles cw/ccw x 15  Exercise 3: 4 way ankle with RTB x 15 ea  Exercise 4: BAPs (fwd/bwd, side/side, circles cw/ccw) x 15 ea  Exercise 5: stand heel/toe raises x 15 , eccentric heel raise L x 12  Exercise 6: L SLS 3 x 30\"  Exercise 7: bike (seat 4 in back) x 5 min  Exercise 8: rocker board side/side, fwd/bwd x 15 ea  Exercise 20: HEP: gastroc stretch, ankle AROM, towel scrunches.  Updated HEP on 22: 4 way ankle tband ex's    *Indicates exercise, modality, or manual techniques to be initiated when appropriate    Strength: [x] NT  [] MMT completed:      ROM: [x] NT  [] ROM measurements:      Assessment: Body Structures, Functions, Activity Limitations Requiring Skilled Therapeutic Intervention: Decreased ROM, Decreased functional mobility , Decreased strength, Decreased balance, Increased pain  Assessment: Progressed ex's with added heel/toe raises, SLS, and rocker board. Pt did well with progressions with slight increased soreness post.  Treatment Diagnosis: decreased left ankle ROM, decreased left ankle strength, left ankle pain  Therapy Prognosis: Good      Post-Pain Assessment:       Pain Rating (0-10 pain scale):   2/10   Location and pain description same as pre-treatment unless indicated. Action: [x] NA   [] Perform HEP  [] Meds as prescribed  [] Modalities as prescribed   [] Call Physician     GOALS   Patient Goal(s): Patient goals : \"strengthen ankle so I can walk without a brace\"      Long Term Goals Completed by 4 weeks Goal Status   LTG 1 Patient will ambulate without brace with </= 1/10 pain level. In progress   LTG 2 Patient will increase left ankle ROM to [facility] for improved functional tolerance. In progress   LTG 3 Patient will increase strength in left ankle to 5/5 for improved ambulation tolerance. In progress   LTG 4 Patient will be independent with HEP. In progress       Plan:  Frequency/Duration:  Plan  Plan Frequency: 1-2  Plan weeks: 4  Current Treatment Recommendations: Strengthening, ROM, Balance training, Manual Therapy - Soft Tissue Mobilization, Manual Therapy - Joint Manipulation, Endurance training, Home exercise program, Patient/Caregiver education & training, Modalities  Pt to continue current HEP. See objective section for any therapeutic exercise changes, additions or modifications this date.     Therapy Time:  PT Individual Minutes  Time In: 8524  Time Out: [address]  Minutes: 38  Timed Code Treatment Minutes: 38 Minutes  Timed Activity Minutes Units   Ther Ex 38 3     Electronically signed by Nithin Fitzgerald PTA on 8/23/22 at 5:36 PM EDT

## 2022-08-25 ENCOUNTER — HOSPITAL ENCOUNTER (OUTPATIENT)
Dept: PHYSICAL THERAPY | Age: 37
Setting detail: THERAPIES SERIES
Discharge: HOME | End: 2022-08-25
Payer: COMMERCIAL

## 2022-08-25 PROCEDURE — 97110 THERAPEUTIC EXERCISES: CPT

## 2022-08-25 ASSESSMENT — PAIN SCALES - GENERAL: PAINLEVEL_OUTOF10: 0

## 2022-08-25 ASSESSMENT — PAIN DESCRIPTION - ORIENTATION: ORIENTATION: LEFT

## 2022-08-25 ASSESSMENT — PAIN DESCRIPTION - LOCATION: LOCATION: ANKLE

## 2022-08-25 NOTE — PROGRESS NOTES
Lilibeth Carranza  [address]  RJYWA:[phone]  Treatment Note        Date: 2022  Patient: Tawny Brambila  : 1985   Confirmed: Yes  MRN: [id number]  Referring Provider: Baron Ordaz DPM  Secondary Referring Provider (If applicable):     Medical Diagnosis: Pain in left ankle and joints of left foot [M25.572]  Sprain of other ligament of left ankle, initial encounter [S93.492A] Sprain of anterior talofiburlar ligament of left ankle; Acute left ankle pain  Treatment Diagnosis: decreased left ankle ROM, decreased left ankle strength, left ankle pain    Visit Information:  Insurance: Payor: UMR / Plan: UMR  / Product Type: *No Product type* /   PT Visit Information  Total # of Visits Approved: 20  Total # of Visits to Date: 5  No Show: 0  Canceled Appointment: 0  Progress Note Counter:     Subjective Information:  Subjective: Pt reports that ankle is doing good. HEP Compliance:  [x] Good [] Fair [] Poor [] Reports not doing due to:    Pain Screening  Patient Currently in Pain: Yes  Pain Assessment: 0-10  Pain Level: 0  Pain Location: Ankle  Pain Orientation: Left    Treatment:  Exercises:  Exercises  Exercise 1: seated gastroc stretch with strap 20s x 3  Exercise 2: ankle AROM (ankle pumps seated x 15), inversion/eversion x 15, ankle circles cw/ccw x 15  Exercise 3: 4 way ankle with RTB x 15 ea  Exercise 4: BAPs (fwd/bwd, side/side, circles cw/ccw) x 15 ea  Exercise 5: stand heel/toe raises x 15 , eccentric heel raise L x 12  Exercise 6: L SLS 3 x 30\"  Exercise 7: bike (seat 4 in back) x 5 min level 4  Exercise 8: rocker board side/side, fwd/bwd x 15 ea  Exercise 9:  slow to encurange sls     Strength: [] NT  [] MMT completed:  Strength LLE  L Ankle Dorsiflexion: 4+/5  L Ankle Plantar Flexion: 4+/5  L Ankle Inversion: 4/5  L Ankle Eversion: 4/5  L Great Toe Extension: 5/5    Assessment:    Body Structures, Functions, Activity Limitations Requiring Skilled Therapeutic Intervention: Decreased ROM, Decreased functional mobility , Decreased strength, Decreased balance, Increased pain  Assessment: Pt with good tariq to exercises. Gave pt GTB for ankle PF. Pt with increased strength noed in ankle. Treatment Diagnosis: decreased left ankle ROM, decreased left ankle strength, left ankle pain        Post-Pain Assessment:       Pain Rating (0-10 pain scale):   1/10   Location and pain description same as pre-treatment unless indicated. Action: [] NA   [x] Perform HEP  [] Meds as prescribed  [] Modalities as prescribed   [] Call Physician     GOALS   Patient Goal(s):      Long Term Goals Completed by 4 weeks Goal Status   LTG 1 Patient will ambulate without brace with </= 1/10 pain level. In progress   LTG 2 Patient will increase left ankle ROM to [facility] for improved functional tolerance. In progress   LTG 3 Patient will increase strength in left ankle to 5/5 for improved ambulation tolerance. In progress   LTG 4 Patient will be independent with HEP. In progress       Plan:  Frequency/Duration:  Plan  Plan Frequency: 1-2  Plan weeks: 4  Current Treatment Recommendations: Strengthening, ROM, Balance training, Manual Therapy - Soft Tissue Mobilization, Manual Therapy - Joint Manipulation, Endurance training, Home exercise program, Patient/Caregiver education & training, Modalities  Pt to continue current HEP. See objective section for any therapeutic exercise changes, additions or modifications this date.     Therapy Time:   PT Individual Minutes  Time In: 1600  Time Out: 8199  Minutes: 44  Timed Code Treatment Minutes: 44 Minutes    Timed Activity Minutes Units   Ther Ex 44 3     Electronically signed by Boston Breaux PTA on 8/25/22 at 4:48 PM EDT

## 2022-08-30 ENCOUNTER — HOSPITAL ENCOUNTER (OUTPATIENT)
Dept: PHYSICAL THERAPY | Age: 37
Setting detail: THERAPIES SERIES
Discharge: HOME | End: 2022-08-30
Payer: COMMERCIAL

## 2022-08-30 PROCEDURE — 97110 THERAPEUTIC EXERCISES: CPT

## 2022-08-30 ASSESSMENT — PAIN SCALES - GENERAL: PAINLEVEL_OUTOF10: 0

## 2022-08-30 ASSESSMENT — PAIN DESCRIPTION - LOCATION: LOCATION: ANKLE

## 2022-08-30 ASSESSMENT — PAIN DESCRIPTION - ORIENTATION: ORIENTATION: LEFT

## 2022-08-30 NOTE — PROGRESS NOTES
[address] [place] [address]  [address]  CVDWY:[phone]  Treatment Note        Date: 2022  Patient: Marly Prater  : 1985   Confirmed: Yes  MRN: [id number]  Referring Provider: Antoine Strange DPM  Medical Diagnosis: Pain in left ankle and joints of left foot [M25.572]  Sprain of other ligament of left ankle, initial encounter [S93.932A]    Treatment Diagnosis: decreased left ankle ROM, decreased left ankle strength, left ankle pain    Visit Information:  Insurance: Payor: R / Plan: [place] Leyva  / Product Type: *No Product type* /   PT Visit Information  Total # of Visits Approved: 20  Total # of Visits to Date: 6  No Show: 0  Canceled Appointment: 0  Progress Note Counter:     Subjective Information:  Subjective: Pt just has a litte bit of ankle pain when she walks but much better overall.   HEP Compliance:  [x] Good [] Fair [] Poor [] Reports not doing due to:    Pain Screening  Patient Currently in Pain: No  Pain Assessment: 0-10  Pain Level: 0  Best Pain Level: 0  Worst Pain Level: 3 (walking)  Pain Location: Ankle  Pain Orientation: Left    Treatment:  Exercises:  Exercises  Exercise 1: seated gastroc stretch with strap 20s x 3  Exercise 2: ankle AROM (ankle pumps seated x 15), inversion/eversion x 15, ankle circles cw/ccw x 15  Exercise 3: 4 way ankle with GTB x 15 ea  Exercise 4: BAPs (fwd/bwd, side/side, circles cw/ccw) x 15 ea  Exercise 5: stand heel/toe raises x 15 , eccentric heel raise L x 15  Exercise 6: L SLS 3 x 30\" ,  L SLS on BBD 3 x 30\"  Exercise 7: bike (seat 4 in back) x 5 min level 4  Exercise 8: rocker board side/side, fwd/bwd x 15 ea    *Indicates exercise, modality, or manual techniques to be initiated when appropriate    Strength: [x] NT  [] MMT completed:      ROM: [] NT  [x] ROM measurements:  AROM LLE (degrees)  L Ankle Dorsiflexion 0-20: 20 deg  L Ankle Plantar Flexion 0-45: 45 deg  L Ankle Forefoot Inversion 0-40: 20 deg  L Ankle

## 2022-09-01 ENCOUNTER — HOSPITAL ENCOUNTER (OUTPATIENT)
Dept: PHYSICAL THERAPY | Age: 37
Setting detail: THERAPIES SERIES
Discharge: HOME | End: 2022-09-01
Payer: COMMERCIAL

## 2022-09-01 PROCEDURE — 97110 THERAPEUTIC EXERCISES: CPT

## 2022-09-01 NOTE — PROGRESS NOTES
[address] [place] [address]  [address]  PEGOS:[phone]  Treatment Note        Date: 2022  Patient: Rambo Gotti  : 1985   Confirmed: Yes  MRN: [id number]  Referring Provider: Gianfranco Wilson DPM  Medical Diagnosis: Pain in left ankle and joints of left foot [M25.572]  Sprain of other ligament of left ankle, initial encounter [S93.133A]    Treatment Diagnosis: decreased left ankle ROM, decreased left ankle strength, left ankle pain    Visit Information:  Insurance: Payor: R / Plan: [facility]  / Product Type: *No Product type* /   PT Visit Information  Total # of Visits Approved: 20  Total # of Visits to Date: 7  No Show: 0  Canceled Appointment: 0  Progress Note Counter:     Subjective Information:  Subjective: Pt's ankle is just a little sore but no pain.   HEP Compliance:  [x] Good [] Fair [] Poor [] Reports not doing due to:    Pain Screening  Patient Currently in Pain: No    Treatment:  Exercises:  Exercises  Exercise 1: seated gastroc stretch with strap 20s x 3  Exercise 2: ankle AROM (ankle pumps seated x 15), inversion/eversion x 15, ankle circles cw/ccw x 15  Exercise 3: 4 way ankle with GTB x 15 ea  Exercise 4: BAPs (fwd/bwd, side/side, circles cw/ccw) x 15 ea  Exercise 5: stand heel/toe raises x 15 , eccentric heel raise L x 15  Exercise 6: L SLS on BBD 4 x 30\"  Exercise 7: bike (seat 4 in back) x 5 min level 5  Exercise 8: rocker board side/side, fwd/bwd x 15 ea      *Indicates exercise, modality, or manual techniques to be initiated when appropriate    Strength: [] NT  [x] MMT completed:  Strength LLE  L Ankle Dorsiflexion: 5/5  L Ankle Plantar Flexion: 5/5  L Ankle Inversion: 5/5  L Ankle Eversion: 5/5  L Great Toe Extension: 5/5      ROM: [] NT  [x] ROM measurements:  AROM LLE (degrees)  L Ankle Dorsiflexion 0-20: 20 deg  L Ankle Plantar Flexion 0-45: 45 deg  L Ankle Forefoot Inversion 0-40: 20 deg  L Ankle Forefoot Eversion 0-20: 20 deg Assessment: Body Structures, Functions, Activity Limitations Requiring Skilled Therapeutic Intervention: Decreased ROM, Decreased functional mobility , Decreased strength, Decreased balance, Increased pain  Assessment: Pt has met all therapy goals per POC with improved ROM, strength, and function. Pt is independent with HEP. Treatment Diagnosis: decreased left ankle ROM, decreased left ankle strength, left ankle pain    Post-Pain Assessment:       Pain Rating (0-10 pain scale):   0/10   Location and pain description same as pre-treatment unless indicated. Action: [x] NA   [] Perform HEP  [] Meds as prescribed  [] Modalities as prescribed   [] Call Physician     GOALS   Patient Goal(s): Patient goals : \"strengthen ankle so I can walk without a brace\"      Long Term Goals Completed by 4 weeks Goal Status   LTG 1 Patient will ambulate without brace with </= 1/10 pain level. Met   LTG 2 Patient will increase left ankle ROM to [facility] for improved functional tolerance. Met   LTG 3 Patient will increase strength in left ankle to 5/5 for improved ambulation tolerance. Met   LTG 4 Patient will be independent with HEP. Met       Plan:  Frequency/Duration:  Plan  Plan Comment: DC from PT  Pt to continue current HEP. See objective section for any therapeutic exercise changes, additions or modifications this date.     Therapy Time:  PT Individual Minutes  Time In: 4835  Time Out: 1527  Minutes: 39  Timed Code Treatment Minutes: 39 Minutes  Timed Activity Minutes Units   Ther Ex 39 3     Electronically signed by Maren Garcia PTA on 9/1/22 at 8:09 AM EDT

## 2022-09-01 NOTE — PROGRESS NOTES
[address]  [address]:[phone]    [] Certification  [] Recertification []  Plan of Care  [] Progress Note [x] Discharge      Referring Provider: Antoine Strange DPM     From:  Tim Oneil, PT   Patient: Marly Prater (27 y.o. female) : 1985 Date: 2022   Medical Diagnosis: Pain in left ankle and joints of left foot [M25.572]  Sprain of other ligament of left ankle, initial encounter [S93.492A]    Treatment Diagnosis: decreased left ankle ROM, decreased left ankle strength, left ankle pain    Progress Report Period from:  2022  to 2022    Visits to Date: 7 No Show: 0 Cancelled Appts: 0    OBJECTIVE:   Short Term Goals = Long term goals     Long Term Goals - Time Frame for Long term goals : 4 weeks  Goals Current/ Discharge status Status   Long term goal 1: Patient will ambulate without brace with </= 1/10 pain level. Pt reports 1/10 pain when ambulating without brace  Met   Long term goal 2: Patient will increase left ankle ROM to [facility] for improved functional tolerance. AROM LLE (degrees)  L Ankle Dorsiflexion 0-20: 20 deg  L Ankle Plantar Flexion 0-45: 45 deg  L Ankle Forefoot Inversion 0-40: 20 deg  L Ankle Forefoot Eversion 0-20: 20 deg  Met   Long term goal 3: Patient will increase strength in left ankle to 5/5 for improved ambulation tolerance. Strength LLE  L Ankle Dorsiflexion: 5/5  L Ankle Plantar Flexion: 5/5  L Ankle Inversion: 5/5  L Ankle Eversion: 5/5  L Great Toe Extension: 5/5 Met   Long term goal 4: Patient will be independent with HEP.  Written HEP provided  for symptom management  Met       Body Structures, Functions, Activity Limitations Requiring Skilled Therapeutic Intervention: Decreased ROM, Decreased functional mobility , Decreased strength, Decreased balance, Increased pain  Assessment: Pt has met all therapy goals per POC with improved ROM, strength, and function. Pt is independent with HEP. PLAN: [x] Discharge   Frequency/Duration:  Plan Comment: DC from PT                     Patient Status:[] Continue/ Initiate plan of Care     [x] Discharge PT. Recommend pt continue with HEP. [] Additional visits requested, Please re-certify for additional visits:     [] Hold     Objective information provided by: Electronically signed by Janet Pruitt PTA on 9/1/22 at 8:33 AM EDT        Signature: Electronically signed by Romana Day, PT on 9/2/2022 at 3:26 PM      If you have any questions or concerns, please don't hesitate to call. Thank you for your referral.    I have reviewed this plan of care and certify a need for medically necessary rehabilitation services.     Physician Signature:__________________________________________________________  Date:  Please sign and return

## 2022-09-02 ENCOUNTER — OFFICE VISIT (OUTPATIENT)
Dept: PODIATRY | Age: 37
End: 2022-09-02
Payer: COMMERCIAL

## 2022-09-02 VITALS
HEIGHT: 64 IN | SYSTOLIC BLOOD PRESSURE: 118 MMHG | WEIGHT: 190 LBS | DIASTOLIC BLOOD PRESSURE: 78 MMHG | TEMPERATURE: 97.1 F | BODY MASS INDEX: 32.44 KG/M2

## 2022-09-02 DIAGNOSIS — S93.492A SPRAIN OF ANTERIOR TALOFIBULAR LIGAMENT OF LEFT ANKLE, INITIAL ENCOUNTER: Primary | ICD-10-CM

## 2022-09-02 PROCEDURE — 99213 OFFICE O/P EST LOW 20 MIN: CPT | Performed by: PODIATRIST

## 2022-09-02 ASSESSMENT — ENCOUNTER SYMPTOMS
BACK PAIN: 0
VOMITING: 0
SHORTNESS OF BREATH: 0
NAUSEA: 0

## 2022-09-02 NOTE — PROGRESS NOTES
Not on file   Occupational History    Not on file   Tobacco Use    Smoking status: Never    Smokeless tobacco: Never   Substance and Sexual Activity    Alcohol use: Not on file    Drug use: Not on file    Sexual activity: Not on file   Other Topics Concern    Not on file   Social History Narrative    Not on file     Social Determinants of Health     Financial Resource Strain: Low Risk     Difficulty of Paying Living Expenses: Not hard at all   Food Insecurity: No Food Insecurity    Worried About Running Out of Food in the Last Year: Never true    [address] in the Last Year: Never true   Transportation Needs: No Transportation Needs    Lack of Transportation (Medical): No    Lack of Transportation (Non-Medical): No   Physical Activity: Not on file   Stress: Not on file   Social Connections: Not on file   Intimate Partner Violence: Not on file   Housing Stability: Not on file     History reviewed. No pertinent family history. Objective:   Vitals:  /78 (Site: Left Upper Arm)   Temp 97.1 °F (36.2 °C) (Temporal)   Ht 5' 4\" (1.626 m)   Wt 190 lb (86.2 kg)   BMI 32.61 kg/m²        Physical Exam  Constitutional:       Appearance: She is obese. HENT:      Head: Normocephalic and atraumatic. Cardiovascular:      Pulses:           Dorsalis pedis pulses are 2+ on the right side and 2+ on the left side. Posterior tibial pulses are 2+ on the right side and 2+ on the left side. Comments: No swelling noted to the lateral left ankle and foot. Pulmonary:      Effort: Pulmonary effort is normal.   Musculoskeletal:         General: No tenderness. Right lower leg: No edema. Left lower leg: No edema. Right foot: Bunion present. Left foot: Normal range of motion. Bunion present. Feet:      Right foot:      Skin integrity: Skin integrity normal.      Toenail Condition: Right toenails are normal.      Left foot:      Skin integrity: Skin integrity normal. No erythema. Toenail Condition: Left toenails are normal.      Comments: Planus foot type noted bilaterally. MMT graded at 5/5 for all muscle groups bilaterally. There is no tenderness to palpation of the anterior talofibular ligament lateral left ankle. Anterior drawer, talar tilt, external rotation, and distal squeeze exams are negative to the left ankle. Hallux abductovalgus deformity noted bilaterally, limited dorsiflexion noted to the right first MPJ. Lymphadenopathy:      Comments: The popliteal lymph nodes are soft and nontender. Skin:     General: Skin is warm and dry. Capillary Refill: Capillary refill takes less than 2 seconds. Findings: No bruising or erythema. Comments: No open lesions noted. No erythema or ecchymosis noted to the left foot and ankle. Normal skin turgor noted bilaterally. Normal skin texture noted bilaterally. Neurological:      General: No focal deficit present. Mental Status: She is alert and oriented to person, place, and time. Deep Tendon Reflexes: Babinski sign absent on the right side. Babinski sign absent on the left side. Reflex Scores:       Patellar reflexes are 2+ on the right side and 2+ on the left side. Achilles reflexes are 2+ on the right side. Comments: No apparent alteration of light touch sensation is noted to the left foot. Psychiatric:         Mood and Affect: Mood normal.         Behavior: Behavior normal.       Assessment:      Diagnosis Orders   1. Sprain of anterior talofibular ligament of left ankle, initial encounter            Plan:     Sprain left anterior talofibular ligament: Patient instructed continue the home exercise plan as directed by physical therapy. Patient instructed to continue use of the ASO when standing and walking until the second week of October. Patient instructed to call/return to clinic sooner should any problems arise. Follow up:  Return in about 7 weeks (around 10/21/2022).     Shankar Randhawa Pauline Davalos DPM      Level of medical decision making: low. Please note that this report has been partially produced using speech recognition software which may cause errors including grammar, punctuation, and spelling or words and phrases that may seem inappropriate. If there are questions or concerns please feel free to contact me for clarification.

## 2022-09-29 ENCOUNTER — OFFICE VISIT (OUTPATIENT)
Dept: PRIMARY CARE CLINIC | Age: 37
End: 2022-09-29
Payer: COMMERCIAL

## 2022-09-29 VITALS
DIASTOLIC BLOOD PRESSURE: 72 MMHG | TEMPERATURE: 98.2 F | WEIGHT: 194 LBS | SYSTOLIC BLOOD PRESSURE: 126 MMHG | HEIGHT: 64 IN | BODY MASS INDEX: 33.12 KG/M2 | OXYGEN SATURATION: 99 % | HEART RATE: 80 BPM

## 2022-09-29 DIAGNOSIS — Z00.00 PREVENTATIVE HEALTH CARE: Primary | ICD-10-CM

## 2022-09-29 PROCEDURE — 99395 PREV VISIT EST AGE 18-39: CPT | Performed by: INTERNAL MEDICINE

## 2022-09-29 RX ORDER — ACETAMINOPHEN 325 MG/1
650 TABLET ORAL EVERY 6 HOURS PRN
COMMUNITY

## 2022-09-29 ASSESSMENT — ENCOUNTER SYMPTOMS
ABDOMINAL DISTENTION: 0
BLOOD IN STOOL: 0
APNEA: 0
FACIAL SWELLING: 0
PHOTOPHOBIA: 0
CHOKING: 0

## 2022-09-29 NOTE — PROGRESS NOTES
[facility] 30 y.o. female presents today with   Chief Complaint   Patient presents with    Annual Exam       HPIannual  12 weeks  Past Medical History:   Diagnosis Date    Acne      Patient Active Problem List    Diagnosis Date Noted    Other acne 07/05/2012     No past surgical history on file. No family history on file. Social History     Socioeconomic History    Marital status:      Spouse name: None    Number of children: None    Years of education: None    Highest education level: None   Tobacco Use    Smoking status: Never    Smokeless tobacco: Never     Social Determinants of Health     Financial Resource Strain: Low Risk     Difficulty of Paying Living Expenses: Not hard at all   Food Insecurity: No Food Insecurity    Worried About Running Out of Food in the Last Year: Never true    Ran Out of Food in the Last Year: Never true   Transportation Needs: No Transportation Needs    Lack of Transportation (Medical): No    Lack of Transportation (Non-Medical): No     No Known Allergies    Review of Systems   Constitutional:  Negative for chills and fever. HENT:  Negative for facial swelling and nosebleeds. Eyes:  Negative for photophobia and visual disturbance. Respiratory:  Negative for apnea and choking. Cardiovascular:  Negative for chest pain and palpitations. Gastrointestinal:  Negative for abdominal distention and blood in stool. Genitourinary:  Negative for enuresis, hematuria and vaginal bleeding. Musculoskeletal:  Negative for arthralgias, gait problem and joint swelling. Skin:  Negative for rash. Neurological:  Negative for syncope and speech difficulty. Hematological:  Does not bruise/bleed easily. Psychiatric/Behavioral:  Negative for agitation, hallucinations and suicidal ideas.           Vitals:    09/29/22 0824   BP: 126/72   Site: Left Upper Arm   Cuff Size: Large Adult   Pulse: 80   Temp: 98.2 °F (36.8 °C)   SpO2: 99%   Weight: 194 lb (88 kg)   Height: 5' 4\" (1.626 m)       Physical Exam  Constitutional:       Appearance: She is well-developed. HENT:      Head: Normocephalic. Eyes:      Conjunctiva/sclera: Conjunctivae normal.   Cardiovascular:      Rate and Rhythm: Normal rate and regular rhythm. Heart sounds: Normal heart sounds. Pulmonary:      Effort: No respiratory distress. Breath sounds: Normal breath sounds. Abdominal:      General: There is no distension. Musculoskeletal:         General: Normal range of motion. Cervical back: Normal range of motion. Skin:     Coloration: Skin is not jaundiced. Neurological:      Mental Status: She is alert and oriented to person, place, and time. Cranial Nerves: No cranial nerve deficit. Psychiatric:         Mood and Affect: Mood normal.      Assessment/Plan  Katina Ruiz was seen today for annual exam.    Diagnoses and all orders for this visit:    Preventative health care  -     Comprehensive Metabolic Panel; Future  -     CBC with Auto Differential; Future  -     Hemoglobin A1C; Future  -     Lipid Panel; Future      No follow-ups on file.     Deedee Hunt MD

## 2022-12-03 LAB
ALBUMIN: 3.3 G/DL (ref 3.4–5)
ALP BLD-CCNC: 62 U/L (ref 33–110)
ALT SERPL-CCNC: 12 U/L (ref 7–45)
ANION GAP SERPL CALCULATED.3IONS-SCNC: 12 MMOL/L (ref 10–20)
AST SERPL-CCNC: 13 U/L (ref 9–39)
BICARBONATE: 23 MMOL/L (ref 21–32)
BILIRUB SERPL-MCNC: 0.3 MG/DL (ref 0–1.2)
CALCIUM SERPL-MCNC: 8.8 MG/DL (ref 8.6–10.3)
CHLORIDE BLD-SCNC: 103 MMOL/L (ref 98–107)
CHOLESTEROL/HDL RATIO: 2.7
CHOLESTEROL: 219 MG/DL (ref 0–199)
CREAT SERPL-MCNC: 0.58 MG/DL (ref 0.5–1)
EGFR FEMALE: >90 ML/MIN/1.73M2
GLUCOSE: 75 MG/DL (ref 74–99)
HDLC SERPL-MCNC: 79.7 MG/DL
LDL CHOLESTEROL: 122 MG/DL (ref 0–99)
POTASSIUM SERPL-SCNC: 3.8 MMOL/L (ref 3.5–5.3)
SODIUM BLD-SCNC: 134 MMOL/L (ref 136–145)
TOTAL PROTEIN: 6.5 G/DL (ref 6.4–8.2)
TRIGL SERPL-MCNC: 87 MG/DL (ref 0–149)
UREA NITROGEN: 8 MG/DL (ref 6–23)
VLDLC SERPL CALC-MCNC: 17 MG/DL (ref 0–40)

## 2023-09-08 ENCOUNTER — OFFICE VISIT (OUTPATIENT)
Dept: PRIMARY CARE CLINIC | Age: 38
End: 2023-09-08
Payer: COMMERCIAL

## 2023-09-08 VITALS
HEIGHT: 64 IN | WEIGHT: 205.8 LBS | DIASTOLIC BLOOD PRESSURE: 82 MMHG | OXYGEN SATURATION: 100 % | BODY MASS INDEX: 35.13 KG/M2 | HEART RATE: 71 BPM | SYSTOLIC BLOOD PRESSURE: 120 MMHG

## 2023-09-08 DIAGNOSIS — R35.0 URINE FREQUENCY: Primary | ICD-10-CM

## 2023-09-08 LAB
BILIRUBIN, POC: NORMAL
BLOOD URINE, POC: NORMAL
CLARITY, POC: CLEAR
COLOR, POC: NORMAL
GLUCOSE URINE, POC: NORMAL
KETONES, POC: NORMAL
LEUKOCYTE EST, POC: NORMAL
NITRITE, POC: NORMAL
PH, POC: 6
PROTEIN, POC: NORMAL
SPECIFIC GRAVITY, POC: 1.01
UROBILINOGEN, POC: NORMAL

## 2023-09-08 PROCEDURE — 81003 URINALYSIS AUTO W/O SCOPE: CPT | Performed by: INTERNAL MEDICINE

## 2023-09-08 PROCEDURE — 99213 OFFICE O/P EST LOW 20 MIN: CPT | Performed by: INTERNAL MEDICINE

## 2023-09-08 RX ORDER — SPIRONOLACTONE 50 MG/1
TABLET, FILM COATED ORAL
COMMUNITY

## 2023-09-08 RX ORDER — ASPIRIN 81 MG/1
TABLET ORAL
COMMUNITY
Start: 2023-02-28

## 2023-09-08 ASSESSMENT — ENCOUNTER SYMPTOMS
WHEEZING: 0
ABDOMINAL PAIN: 0
NAUSEA: 0
COUGH: 0
DIARRHEA: 0
SHORTNESS OF BREATH: 0
VOMITING: 0

## 2023-09-15 ENCOUNTER — OFFICE VISIT (OUTPATIENT)
Dept: PRIMARY CARE CLINIC | Age: 38
End: 2023-09-15

## 2023-09-15 VITALS
OXYGEN SATURATION: 99 % | BODY MASS INDEX: 35.17 KG/M2 | DIASTOLIC BLOOD PRESSURE: 84 MMHG | SYSTOLIC BLOOD PRESSURE: 122 MMHG | HEART RATE: 73 BPM | HEIGHT: 64 IN | TEMPERATURE: 98.6 F | WEIGHT: 206 LBS

## 2023-09-15 DIAGNOSIS — R30.0 BURNING WITH URINATION: ICD-10-CM

## 2023-09-15 DIAGNOSIS — N90.89 VULVAR IRRITATION: Primary | ICD-10-CM

## 2023-09-15 LAB
BILIRUBIN, POC: NORMAL
BLOOD URINE, POC: 200
CLARITY, POC: NORMAL
COLOR, POC: NORMAL
GLUCOSE URINE, POC: NORMAL
KETONES, POC: NORMAL
LEUKOCYTE EST, POC: NORMAL
NITRITE, POC: NORMAL
PH, POC: 7
PROTEIN, POC: NORMAL
SPECIFIC GRAVITY, POC: 1.01
UROBILINOGEN, POC: 3.5

## 2023-09-15 NOTE — PROGRESS NOTES
Subjective:      Patient ID: Jamar Foss is a 41 y.o. female    Burning urination x 18 days   HPI  Pt presents with 18 days of burning urination and genital soreness, relieved with hydrocortisone 2.5%. Treated last week for UTI with Macrobid. No vaginal discharge, no rash. No hx of STD. Past Medical History:   Diagnosis Date    Acne      History reviewed. No pertinent surgical history. Social History     Socioeconomic History    Marital status:      Spouse name: Not on file    Number of children: Not on file    Years of education: Not on file    Highest education level: Not on file   Occupational History    Not on file   Tobacco Use    Smoking status: Never    Smokeless tobacco: Never   Substance and Sexual Activity    Alcohol use: Not on file    Drug use: Not on file    Sexual activity: Not on file   Other Topics Concern    Not on file   Social History Narrative    Not on file     Social Determinants of Health     Financial Resource Strain: Low Risk  (10/29/2021)    Overall Financial Resource Strain (CARDIA)     Difficulty of Paying Living Expenses: Not hard at all   Food Insecurity: No Food Insecurity (10/29/2021)    Hunger Vital Sign     Worried About Running Out of Food in the Last Year: Never true     [address] in the Last Year: Never true   Transportation Needs: No Transportation Needs (10/29/2021)    PRAPARE - Transportation     Lack of Transportation (Medical): No     Lack of Transportation (Non-Medical): No   Physical Activity: Not on file   Stress: Not on file   Social Connections: Not on file   Intimate Partner Violence: Not on file   Housing Stability: Not on file     History reviewed. No pertinent family history. Allergies:  Patient has no known allergies. Patient Active Problem List   Diagnosis    Other acne     Current Outpatient Medications on File Prior to Visit   Medication Sig Dispense Refill    hydrocortisone 2.5 % cream Apply topically 2 times daily.  1 each 2    aspirin

## 2023-09-16 ASSESSMENT — ENCOUNTER SYMPTOMS
ABDOMINAL PAIN: 0
VOMITING: 0
COUGH: 0
WHEEZING: 0
SHORTNESS OF BREATH: 0
NAUSEA: 0
DIARRHEA: 0